# Patient Record
Sex: FEMALE | Race: WHITE | NOT HISPANIC OR LATINO | Employment: FULL TIME | URBAN - NONMETROPOLITAN AREA
[De-identification: names, ages, dates, MRNs, and addresses within clinical notes are randomized per-mention and may not be internally consistent; named-entity substitution may affect disease eponyms.]

---

## 2024-10-25 ENCOUNTER — OFFICE VISIT (OUTPATIENT)
Dept: URGENT CARE | Facility: URGENT CARE | Age: 24
End: 2024-10-25
Payer: COMMERCIAL

## 2024-10-25 ENCOUNTER — HOSPITAL ENCOUNTER (OUTPATIENT)
Dept: RADIOLOGY | Facility: CLINIC | Age: 24
Discharge: HOME | End: 2024-10-25
Payer: COMMERCIAL

## 2024-10-25 VITALS
HEART RATE: 90 BPM | BODY MASS INDEX: 20.2 KG/M2 | RESPIRATION RATE: 20 BRPM | OXYGEN SATURATION: 99 % | SYSTOLIC BLOOD PRESSURE: 104 MMHG | WEIGHT: 128.97 LBS | TEMPERATURE: 97.7 F | DIASTOLIC BLOOD PRESSURE: 81 MMHG

## 2024-10-25 DIAGNOSIS — J45.21 MILD INTERMITTENT ASTHMA WITH EXACERBATION (HHS-HCC): ICD-10-CM

## 2024-10-25 DIAGNOSIS — R09.89 ABNORMAL LUNG SOUNDS: Primary | ICD-10-CM

## 2024-10-25 DIAGNOSIS — J30.89 SEASONAL ALLERGIC RHINITIS DUE TO OTHER ALLERGIC TRIGGER: ICD-10-CM

## 2024-10-25 DIAGNOSIS — R09.89 ABNORMAL LUNG SOUNDS: ICD-10-CM

## 2024-10-25 DIAGNOSIS — J02.8 PHARYNGITIS DUE TO OTHER ORGANISM: ICD-10-CM

## 2024-10-25 PROBLEM — D64.9 ANEMIA: Status: ACTIVE | Noted: 2024-10-25

## 2024-10-25 PROBLEM — G23.8: Status: ACTIVE | Noted: 2022-08-30

## 2024-10-25 PROBLEM — R06.02 SOB (SHORTNESS OF BREATH): Status: ACTIVE | Noted: 2024-10-25

## 2024-10-25 LAB
POC RAPID STREP: NEGATIVE
PREGNANCY TEST URINE, POC: NEGATIVE

## 2024-10-25 PROCEDURE — 99214 OFFICE O/P EST MOD 30 MIN: CPT | Performed by: PHYSICIAN ASSISTANT

## 2024-10-25 PROCEDURE — 81025 URINE PREGNANCY TEST: CPT | Performed by: PHYSICIAN ASSISTANT

## 2024-10-25 PROCEDURE — 87880 STREP A ASSAY W/OPTIC: CPT | Performed by: PHYSICIAN ASSISTANT

## 2024-10-25 PROCEDURE — 87651 STREP A DNA AMP PROBE: CPT

## 2024-10-25 PROCEDURE — 71046 X-RAY EXAM CHEST 2 VIEWS: CPT

## 2024-10-25 RX ORDER — INHALER, ASSIST DEVICES
SPACER (EA) MISCELLANEOUS
Qty: 1 EACH | Refills: 0 | Status: SHIPPED | OUTPATIENT
Start: 2024-10-25 | End: 2025-10-25

## 2024-10-25 RX ORDER — ALBUTEROL SULFATE 90 UG/1
2 INHALANT RESPIRATORY (INHALATION) EVERY 6 HOURS PRN
Qty: 8.5 G | Refills: 0 | Status: SHIPPED | OUTPATIENT
Start: 2024-10-25 | End: 2025-10-25

## 2024-10-25 RX ORDER — CETIRIZINE HYDROCHLORIDE 10 MG/1
10 TABLET ORAL DAILY
Qty: 30 TABLET | Refills: 0 | Status: SHIPPED | OUTPATIENT
Start: 2024-10-25 | End: 2025-10-25

## 2024-10-25 RX ORDER — METHYLPREDNISOLONE 4 MG/1
TABLET ORAL
Qty: 21 TABLET | Refills: 0 | Status: SHIPPED | OUTPATIENT
Start: 2024-10-25 | End: 2024-11-01

## 2024-10-25 RX ORDER — FLUTICASONE PROPIONATE 50 MCG
1 SPRAY, SUSPENSION (ML) NASAL 2 TIMES DAILY
Qty: 16 G | Refills: 0 | Status: SHIPPED | OUTPATIENT
Start: 2024-10-25 | End: 2024-11-01

## 2024-10-25 RX ORDER — AMOXICILLIN AND CLAVULANATE POTASSIUM 875; 125 MG/1; MG/1
875 TABLET, FILM COATED ORAL 2 TIMES DAILY
Qty: 20 TABLET | Refills: 0 | Status: SHIPPED | OUTPATIENT
Start: 2024-10-25

## 2024-10-25 ASSESSMENT — PAIN SCALES - GENERAL: PAINLEVEL_OUTOF10: 4

## 2024-10-25 NOTE — PATIENT INSTRUCTIONS
Asthma exacerbation- Start Albuterol inhaler with spacer 2 puffs every 6 hr as needed cough or wheeze. Start Medrol dose serge as directed, take with food. Avoid NSAIDS while on oral steroid. Go to ER if worsening breathing or chest pain or dizziness. Smoking cessation to help with healing and recovery.    Allergic rhinitis with suspected sinusitis- start Augmentin twice a day x 10 days, take with food and probiotic. Start Flonase 1 spray each nostril daily and zyrtec 10mg daily x 1 week. Recommend OTC expectorant such as robitussin with plenty of fluids    Pharyngitis- Rapid strep negative. Strep PCR sent.   Strep Precautions:   No kissing or sharing utensils or cups for 24 hours.   Change ToothBrush and wash bedding after completing 24 hours of antibiotic.   Return to work after completing 24 hours of antibiotic and fever free for 24 hours

## 2024-10-25 NOTE — PROGRESS NOTES
Subjective   Patient ID: Kinsey Nicole is a 24 y.o. female with history of sport induced asthma present today with a chief complaint of Other (Pt. C/O sore throat, body aches, tired and cold. /Started 1 week ago. /Boy friend was here 2 days ago and was positive for strep. ).    History of Present Illness  HPI  Pt reports sore throat worse in the morning. Productive cough with wheezing and recently dyspnea with going up the stairs. She reports normal appetite, mostly soft foods due to sore throat. No vomiting or dysphagia. No fever. She reports her  was diagnosed with strep and requesting testing. She has tried Dayquil and ibuprofen with temporary relief. Pt is sexually active and not using contraception. LMP 10/1/24.     Past Medical History  Allergies as of 10/25/2024    (No Known Allergies)       (Not in a hospital admission)       Past Medical History:   Diagnosis Date    Acute sinusitis, unspecified 04/06/2016    Acute non-recurrent sinusitis, unspecified location    Other specified health status     Known health problems: none    Personal history of diseases of the skin and subcutaneous tissue 12/16/2015    History of contact dermatitis    Personal history of diseases of the skin and subcutaneous tissue 01/04/2016    History of impetigo    Personal history of other diseases of the female genital tract 10/24/2018    History of dysfunctional uterine bleeding    Personal history of other diseases of the respiratory system 04/06/2016    History of upper respiratory infection    Personal history of other diseases of the respiratory system 10/10/2014    History of acute sinusitis    Personal history of other diseases of the respiratory system 10/13/2014    History of acute bronchitis    Personal history of other diseases of the respiratory system 04/06/2016    History of sore throat    Personal history of other specified conditions 06/05/2015    History of dizziness       History reviewed. No pertinent surgical  history.     reports that she has been smoking cigarettes. She has never used smokeless tobacco. She reports current drug use. Drug: Marijuana.    Review of Systems  Review of Systems                               Objective    Vitals:    10/25/24 0851   BP: 104/81   BP Location: Right arm   Patient Position: Sitting   BP Cuff Size: Adult   Pulse: 90   Resp: 20   Temp: 36.5 °C (97.7 °F)   TempSrc: Oral   SpO2: 99%   Weight: 58.5 kg (128 lb 15.5 oz)     Patient's last menstrual period was 10/01/2024.    Physical Exam  Vitals and nursing note reviewed.   Constitutional:       Appearance: Normal appearance. She is not ill-appearing.      Comments: Pleasant white female   HENT:      Head: Normocephalic and atraumatic.      Right Ear: Ear canal and external ear normal.      Left Ear: Ear canal and external ear normal.      Ears:      Comments: Air fluid levels b/l     Nose: Congestion and rhinorrhea present.      Comments: Erythematous edematous turbinates     Mouth/Throat:      Mouth: Mucous membranes are moist.      Pharynx: Posterior oropharyngeal erythema present.      Comments: Mild tonsillar hypertrophy, moderate post nasal drip  Eyes:      Extraocular Movements: Extraocular movements intact.      Conjunctiva/sclera: Conjunctivae normal.      Pupils: Pupils are equal, round, and reactive to light.   Cardiovascular:      Rate and Rhythm: Normal rate and regular rhythm.      Heart sounds: No murmur heard.  Pulmonary:      Effort: Pulmonary effort is normal.      Breath sounds: Wheezing present.      Comments: ENA rhonchi  Musculoskeletal:         General: Normal range of motion.      Cervical back: Normal range of motion and neck supple.   Lymphadenopathy:      Cervical: Cervical adenopathy present.   Skin:     General: Skin is warm and dry.      Findings: No rash.   Neurological:      General: No focal deficit present.      Mental Status: She is alert and oriented to person, place, and time.   Psychiatric:          Mood and Affect: Mood normal.         Procedures    Point of Care Test & Imaging Results from this visit  Results for orders placed or performed in visit on 10/25/24   Group A Streptococcus, PCR    Specimen: Throat/Pharynx; Swab   Result Value Ref Range    Group A Strep PCR Not Detected Not Detected   POCT rapid strep A manually resulted   Result Value Ref Range    POC Rapid Strep Negative Negative   POCT pregnancy, urine manually resulted   Result Value Ref Range    Preg Test, Ur Negative Negative      No results found.  Narrative & Impression   STUDY:  Chest Radiographs;  10/25/2023, 09:29AM  INDICATION:  Rhonchi left upper lobe, mildly diminished breath sounds, wheezing.  COMPARISON:  None Available  ACCESSION NUMBER(S):  [id number]  ORDERING CLINICIAN:  MADDISON CRUZ  TECHNIQUE:  Frontal and lateral chest.   FINDINGS:  CARDIOMEDIASTINAL SILHOUETTE:  Cardiomediastinal silhouette is normal in size and configuration.     LUNGS:  Lungs are clear.     ABDOMEN:  No remarkable upper abdominal findings.     BONES:  No acute osseous changes.  IMPRESSION:  No radiographic evidence of acute cardiopulmonary disease.  Signed by Sven Majano MD        Diagnostic study results (if any) were reviewed by Maddison Cruz PA-C.    Assessment/Plan   Allergies, medications, history, and pertinent labs/EKGs/Imaging reviewed by Maddison Cruz PA-C.     Medical Decision Making  25 yo F presents with c/o sore throat worse in the morning, body aches, feeling tired and cold x 1 week, exposed to  who had strep throat 2 days ago. Productive cough with wheezing and dyspnea with going up the stairs for past few days.  Reviewed vitals stable. Exam remarkable for nasal congestion, rhinorrhea, pharyngeal erythema, cobbling from post nasal drip and mild tonsillar hypertrophy w/o exudate, tender anterior shotty cervical lymphadenopathy, mildly diminished breath sounds diffuse expiratory wheezes and rhonchi ENA. CXR  reviewed, negative. Discussed negative Rapid Strep swab, Strep PCR sent per pt request. Strep Precautions reviewed. Discussed treatment of Asthma Exacerbation with Albuterol inhaler 2 puffs every 6h as needed, Medrol dose serge. Advised to go to ER if difficulty breathing or chest pain or dizziness. Advised to start Flonase nasal spray and zyrtec daily x1 week for underlying allergies.    Orders and Diagnoses  Diagnoses and all orders for this visit:  Abnormal lung sounds  -     XR chest 2 views; Future  -     POCT pregnancy, urine manually resulted  -     amoxicillin-pot clavulanate (Augmentin) 875-125 mg tablet; Take 1 tablet (875 mg) by mouth 2 times a day.  Pharyngitis due to other organism  -     POCT rapid strep A manually resulted  -     Group A Streptococcus, PCR  Mild intermittent asthma with exacerbation ([facility])  -     albuterol (ProAir HFA) 90 mcg/actuation inhaler; Inhale 2 puffs every 6 hours if needed for wheezing or shortness of breath. Use with spacer  -     inhalational spacing device (BreatheRite MDI Spacer) inhaler; Use as instructed with albuterol inhaler  -     POCT pregnancy, urine manually resulted  -     methylPREDNISolone (Medrol Dospak) 4 mg tablets; Follow schedule on package instructions  Seasonal allergic rhinitis due to other allergic trigger  -     fluticasone (Flonase) 50 mcg/actuation nasal spray; Administer 1 spray into each nostril 2 times a day for 7 days. Shake gently. Before first use, prime pump. After use, clean tip and replace cap.  -     cetirizine (ZyrTEC) 10 mg tablet; Take 1 tablet (10 mg) by mouth once daily.      Medical Admin Record      Patient disposition: Home    Electronically signed by Hanna Cruz PA-C  8:26 AM

## 2024-10-25 NOTE — LETTER
October 25, 2024     Patient: Kinsey Nicole   YOB: 2000   Date of Visit: 10/25/2024       To Whom It May Concern:    Kinsey Nicole was seen in my clinic on 10/25/2024 at 8:30 am. Please excuse Kinsey for her absence from work on this day to make the appointment.    If you have any questions or concerns, please don't hesitate to call.         Sincerely,         Hanna Cruz PA-C        CC: No Recipients

## 2024-10-26 LAB — S PYO DNA THROAT QL NAA+PROBE: NOT DETECTED

## 2025-06-25 ENCOUNTER — OFFICE VISIT (OUTPATIENT)
Dept: URGENT CARE | Facility: URGENT CARE | Age: 25
End: 2025-06-25
Payer: COMMERCIAL

## 2025-06-25 VITALS
HEART RATE: 89 BPM | DIASTOLIC BLOOD PRESSURE: 99 MMHG | SYSTOLIC BLOOD PRESSURE: 136 MMHG | TEMPERATURE: 98.3 F | WEIGHT: 130 LBS | RESPIRATION RATE: 18 BRPM | OXYGEN SATURATION: 97 % | BODY MASS INDEX: 20.36 KG/M2

## 2025-06-25 DIAGNOSIS — J01.00 ACUTE NON-RECURRENT MAXILLARY SINUSITIS: Primary | ICD-10-CM

## 2025-06-25 DIAGNOSIS — J02.9 SORE THROAT: ICD-10-CM

## 2025-06-25 DIAGNOSIS — J98.8 WHEEZING-ASSOCIATED RESPIRATORY INFECTION: ICD-10-CM

## 2025-06-25 LAB
POC HUMAN RHINOVIRUS PCR: NEGATIVE
POC INFLUENZA A VIRUS PCR: NEGATIVE
POC INFLUENZA B VIRUS PCR: NEGATIVE
POC RESPIRATORY SYNCYTIAL VIRUS PCR: NEGATIVE
POC SARS-COV-2 AG BINAX: NORMAL
POC STREPTOCOCCUS PYOGENES (GROUP A STREP) PCR: NEGATIVE

## 2025-06-25 PROCEDURE — 4004F PT TOBACCO SCREEN RCVD TLK: CPT | Performed by: PHYSICIAN ASSISTANT

## 2025-06-25 PROCEDURE — 87811 SARS-COV-2 COVID19 W/OPTIC: CPT | Performed by: PHYSICIAN ASSISTANT

## 2025-06-25 PROCEDURE — 99214 OFFICE O/P EST MOD 30 MIN: CPT | Performed by: PHYSICIAN ASSISTANT

## 2025-06-25 PROCEDURE — 87631 RESP VIRUS 3-5 TARGETS: CPT | Performed by: PHYSICIAN ASSISTANT

## 2025-06-25 PROCEDURE — 87651 STREP A DNA AMP PROBE: CPT | Performed by: PHYSICIAN ASSISTANT

## 2025-06-25 RX ORDER — CHOLECALCIFEROL (VITAMIN D3) 25 MCG
2000 TABLET ORAL
COMMUNITY
Start: 2025-05-29

## 2025-06-25 RX ORDER — PREDNISONE 20 MG/1
40 TABLET ORAL DAILY
Qty: 10 TABLET | Refills: 0 | Status: SHIPPED | OUTPATIENT
Start: 2025-06-25 | End: 2025-06-30

## 2025-06-25 RX ORDER — CALCITRIOL 0.25 UG/1
0.25 CAPSULE ORAL
COMMUNITY
Start: 2025-05-29

## 2025-06-25 RX ORDER — AMOXICILLIN AND CLAVULANATE POTASSIUM 875; 125 MG/1; MG/1
875 TABLET, FILM COATED ORAL 2 TIMES DAILY
Qty: 20 TABLET | Refills: 0 | Status: SHIPPED | OUTPATIENT
Start: 2025-06-25

## 2025-06-25 RX ORDER — PALOPEGTERIPARATIDE 294 UG/.98ML
18 INJECTION, SOLUTION SUBCUTANEOUS
COMMUNITY
Start: 2025-06-19

## 2025-06-25 ASSESSMENT — ENCOUNTER SYMPTOMS
SORE THROAT: 1
DIZZINESS: 1
COUGH: 1

## 2025-06-25 NOTE — PATIENT INSTRUCTIONS
Acute sore throat- 5 Respiratory PCR panel negative for human rhinovirus, influenza A & B, RSV and Group A Strep. Recommend home Covid test today. Recommend warm salt water gargles 2-3 times a day, throat lozenges (cepacol), tylenol 500mg every 6hr for pain (max 4000mg in 24 hr).    Acute wheezing respiratory illness- suspect reactive airway disease vs mucus plugging. Recommend Albuterol inhaler with spacer 2 puffs every 6hrs as needed for cough or wheeze. Start Prednisone 40mg daily x 5 days; take with food and Pepcid 20mg twice a day until steroid completed to decrease GI upset. Recommend otc Robitussin with plenty of fluids (2L/day water) and clear pedialyte once a day for electrolytes.   Go to ER if chest pain or difficulty breathing or dizziness or fever 103 with headache or neck stiffness.    Bilateral eustachian tube dysfunction- trial of flonase 1 spray each nostril twice a day x 3 days and zyrtec 10mg daily x 1 week.

## 2025-06-25 NOTE — LETTER
June 25, 2025     Patient: Kinsey Nicole   YOB: 2000   Date of Visit: 6/25/2025       To Whom It May Concern:    Kinsey Nicole was seen in my clinic on 6/25/2025 at 1:05 pm. Please excuse the parent of Kinsey for her absence from work on this day to assist this patient to her appointment.    If you have any questions or concerns, please don't hesitate to call.         Sincerely,         Hanna Cruz PA-C        CC: No Recipients

## 2025-06-25 NOTE — PROGRESS NOTES
"Subjective   Patient ID: Kinsey Nicole is a 24 y.o. female with calcification of basal ganglia with chronic dizziness present with mom today with a chief complaint of Nasal Congestion (2 days ), Sore Throat (2 days ), Cough (Coughing up a lot of mucus pain when coughing 2 days ), Dizziness (2 days ), and Chills (2 days).    History of Present Illness    Sore Throat   Associated symptoms include coughing.   Cough  Associated symptoms include a sore throat.   Dizziness    Pt reports her mom drove her today. Pt reports symptoms started with mild cough and tired 3 days ago, got worse yesterday with dizziness. Pt reports chills but never checked her temps. Pt reports dizziness with nausea occurs with standing up or moving her head to fast, initially feels off balance equilibrium is off then feels like she can pass out. Hx of seizures in September 2024, with overnight hospitalization. No vomiting. Pt has dry heaving every morning. Pt reports productive cough with \"lungs hurt so bad.\" No hx of asthma but used rescue inhaler for covid infection. FH of sister with asthma.  Pt did not take any otc meds. She started yorvipath for her chronic condition 9 days ago. Pt reports quit smoking few days ago.  Past Medical History  Allergies as of 06/25/2025    (No Known Allergies)       Prescriptions Prior to Admission[1]     Medical History[2]    Surgical History[3]     reports that she has been smoking cigarettes. She has never used smokeless tobacco. She reports current drug use. Drug: Marijuana.    Review of Systems  Review of Systems   HENT:  Positive for sore throat.    Respiratory:  Positive for cough.    Neurological:  Positive for dizziness.                                  Objective    Vitals:    06/25/25 1319   BP: (!) 136/99   Pulse: 89   Resp: 18   Temp: 36.8 °C (98.3 °F)   TempSrc: Oral   SpO2: 97%   Weight: 59 kg (130 lb)     Patient's last menstrual period was 06/20/2025.    Physical Exam  Constitutional:       " Appearance: Normal appearance.   HENT:      Head: Normocephalic and atraumatic.      Right Ear: Tympanic membrane normal.      Left Ear: Tympanic membrane normal.      Ears:      Comments: Air fluid levels     Nose: Congestion and rhinorrhea present.      Mouth/Throat:      Mouth: Mucous membranes are moist.      Pharynx: Posterior oropharyngeal erythema present. No oropharyngeal exudate.   Eyes:      Extraocular Movements: Extraocular movements intact.      Conjunctiva/sclera: Conjunctivae normal.      Pupils: Pupils are equal, round, and reactive to light.   Cardiovascular:      Rate and Rhythm: Normal rate and regular rhythm.      Heart sounds: No murmur heard.  Pulmonary:      Effort: Pulmonary effort is normal. No respiratory distress.      Breath sounds: Wheezing (diffuse) present.   Musculoskeletal:         General: Normal range of motion.      Cervical back: Normal range of motion and neck supple.   Lymphadenopathy:      Cervical: No cervical adenopathy.   Skin:     General: Skin is warm.   Neurological:      General: No focal deficit present.      Mental Status: She is alert.         Procedures    Point of Care Test & Imaging Results from this visit  Results for orders placed or performed in visit on 06/25/25   POCT SPOTFIRE R/ST Panel Mini w/Strep A ([facility]) manually resulted   Result Value Ref Range    POC Group A Strep, PCR Negative Negative    POC Respiratory Syncytial Virus PCR Negative Negative    POC Influenza A Virus PCR Negative Negative    POC Influenza B Virus PCR Negative Negative    POC Human Rhinovirus PCR Negative Negative   POCT Covid-19 Rapid Antigen   Result Value Ref Range    POC REAL-COV-2 AG  Presumptive negative test for SARS-CoV-2 (no antigen detected)     Presumptive negative test for SARS-CoV-2 (no antigen detected)      Imaging  No results found.    Cardiology, Vascular, and Other Imaging  No other imaging results found for the past 2 days      Diagnostic study results (if any)  were reviewed by Hanna Cruz PA-C.    Assessment/Plan   Allergies, medications, history, and pertinent labs/EKGs/Imaging reviewed by Hanna Cruz PA-C.     Medical Decision Making  24 y.o. female with calcification of basal ganglia with chronic dizziness present with mom today with a chief complaint of Nasal Congestion (2 days ), Sore Throat (2 days ), Cough (Coughing up a lot of mucus pain when coughing 2 days ), Dizziness (2 days ), and Chills (2 days).  Reviewed vitals stable. Exam remarkable for B/L air fluid levels, nasal congestion, rhinorrhea, pharyngeal erythema without exudate, mildly diminished breath sounds with diffuse exp wheezes without respiratory distress     Discussed with patient treatment for Acute sore throat- 5 Respiratory PCR panel negative for human rhinovirus, influenza A & B, RSV and Group A Strep. Recommend home Covid test today. Recommend warm salt water gargles 2-3 times a day, throat lozenges (cepacol), tylenol 500mg every 6hr for pain (max 4000mg in 24 hr).    Acute wheezing respiratory illness- suspect reactive airway disease vs mucus plugging. Recommend Albuterol inhaler with spacer 2 puffs every 6hrs as needed for cough or wheeze. Start Prednisone 40mg daily x 5 days; take with food and Pepcid 20mg twice a day until steroid completed to decrease GI upset. Recommend otc Robitussin with plenty of fluids (2L/day water) and clear pedialyte once a day for electrolytes.   Go to ER if chest pain or difficulty breathing or dizziness or fever 103 with headache or neck stiffness.    Bilateral eustachian tube dysfunction causing vertigo- trial of flonase 1 spray each nostril twice a day x 3 days and zyrtec 10mg daily x 1 week.    Orders and Diagnoses  Diagnoses and all orders for this visit:  Acute non-recurrent maxillary sinusitis  -     amoxicillin-clavulanate (Augmentin) 875-125 mg tablet; Take 1 tablet (875 mg of amoxicillin) by mouth 2 times a day.  Sore throat  -      POCT SPOTFIRE R/ST Panel Mini w/Strep A ([facility]) manually resulted  -     POCT Covid-19 Rapid Antigen  Wheezing-associated respiratory infection  -     POCT SPOTFIRE R/ST Panel Mini w/Strep A ([facility]) manually resulted  -     POCT Covid-19 Rapid Antigen  -     predniSONE (Deltasone) 20 mg tablet; Take 2 tablets (40 mg) by mouth once daily for 5 days.      Medical Admin Record      Patient disposition: Home    Electronically signed by Hanna Cruz PA-C  2:26 PM           [1] (Not in a hospital admission)   [2]   Past Medical History:  Diagnosis Date    Acute sinusitis, unspecified 04/06/2016    Acute non-recurrent sinusitis, unspecified location    Other specified health status     Known health problems: none    Personal history of diseases of the skin and subcutaneous tissue 12/16/2015    History of contact dermatitis    Personal history of diseases of the skin and subcutaneous tissue 01/04/2016    History of impetigo    Personal history of other diseases of the female genital tract 10/24/2018    History of dysfunctional uterine bleeding    Personal history of other diseases of the respiratory system 04/06/2016    History of upper respiratory infection    Personal history of other diseases of the respiratory system 10/10/2014    History of acute sinusitis    Personal history of other diseases of the respiratory system 10/13/2014    History of acute bronchitis    Personal history of other diseases of the respiratory system 04/06/2016    History of sore throat    Personal history of other specified conditions 06/05/2015    History of dizziness   [3] History reviewed. No pertinent surgical history.

## 2025-06-26 ENCOUNTER — TELEPHONE (OUTPATIENT)
Dept: URGENT CARE | Facility: URGENT CARE | Age: 25
End: 2025-06-26

## 2025-08-07 ENCOUNTER — OFFICE VISIT (OUTPATIENT)
Dept: URGENT CARE | Facility: URGENT CARE | Age: 25
End: 2025-08-07
Payer: COMMERCIAL

## 2025-08-07 VITALS
WEIGHT: 130 LBS | HEART RATE: 63 BPM | OXYGEN SATURATION: 98 % | SYSTOLIC BLOOD PRESSURE: 125 MMHG | DIASTOLIC BLOOD PRESSURE: 81 MMHG | BODY MASS INDEX: 20.36 KG/M2 | RESPIRATION RATE: 15 BRPM | TEMPERATURE: 97.7 F

## 2025-08-07 DIAGNOSIS — R11.2 NAUSEA AND VOMITING, UNSPECIFIED VOMITING TYPE: Primary | ICD-10-CM

## 2025-08-07 RX ORDER — PROCHLORPERAZINE MALEATE 5 MG
5 TABLET ORAL EVERY 6 HOURS PRN
Qty: 30 TABLET | Refills: 0 | Status: SHIPPED | OUTPATIENT
Start: 2025-08-07 | End: 2025-08-14

## 2025-08-07 RX ORDER — ONDANSETRON HYDROCHLORIDE 4 MG/2ML
4 INJECTION, SOLUTION INTRAMUSCULAR; INTRAVENOUS ONCE
Status: COMPLETED | OUTPATIENT
Start: 2025-08-07 | End: 2025-08-07

## 2025-08-07 RX ORDER — ONDANSETRON 4 MG/1
4 TABLET, FILM COATED ORAL EVERY 8 HOURS PRN
Qty: 10 TABLET | Refills: 0 | Status: SHIPPED | OUTPATIENT
Start: 2025-08-07 | End: 2025-08-14

## 2025-08-07 RX ADMIN — ONDANSETRON HYDROCHLORIDE 4 MG: 4 INJECTION, SOLUTION INTRAMUSCULAR; INTRAVENOUS at 15:54

## 2025-08-07 ASSESSMENT — ENCOUNTER SYMPTOMS
PSYCHIATRIC NEGATIVE: 1
VOMITING: 1
NEUROLOGICAL NEGATIVE: 1
NAUSEA: 1
CONSTITUTIONAL NEGATIVE: 1
ENDOCRINE NEGATIVE: 1
EYES NEGATIVE: 1
HEMATOLOGIC/LYMPHATIC NEGATIVE: 1
RESPIRATORY NEGATIVE: 1
CARDIOVASCULAR NEGATIVE: 1
ALLERGIC/IMMUNOLOGIC NEGATIVE: 1
ABDOMINAL PAIN: 1
MUSCULOSKELETAL NEGATIVE: 1

## 2025-08-07 NOTE — LETTER
August 7, 2025     Patient: Kinsey Nicole   YOB: 2000   Date of Visit: 8/7/2025       To Whom It May Concern:    Kinsey Nicole was seen in my clinic on 8/7/2025 at 3:40 pm. Please excuse Kinsey for her absence from work on this day to make the appointment.    If you have any questions or concerns, please don't hesitate to call.         Sincerely,         Magda Tam, APRN-CNP        CC: No Recipients

## 2025-08-08 NOTE — PROGRESS NOTES
Subjective   Patient ID: Kinsey Nicole is a 25 y.o. female. They present today with a chief complaint of Vomiting (Since 7 am this morning ) and Chills (Started this morning ).    History of Present Illness    History provided by:  Police  Vomiting  Severity:  Severe  Duration:  1 day  Timing:  Constant  Number of daily episodes:  10  Quality:  Bilious material  Able to tolerate:  Liquids  How soon after eating does vomiting occur:  1 second  Progression:  Worsening  Chronicity:  Chronic  Recent urination:  Decreased  Relieved by:  Nothing  Worsened by:  Food smell  Ineffective treatments:  None tried  Associated symptoms: abdominal pain        Past Medical History  Allergies as of 08/07/2025    (No Known Allergies)       Prescriptions Prior to Admission[1]     Medical History[2]    Surgical History[3]     reports that she has been smoking cigarettes. She has never used smokeless tobacco. She reports current drug use. Drug: Marijuana.    Review of Systems  Review of Systems   Constitutional: Negative.    HENT: Negative.     Eyes: Negative.    Respiratory: Negative.     Cardiovascular: Negative.    Gastrointestinal:  Positive for abdominal pain, nausea and vomiting.   Endocrine: Negative.    Genitourinary: Negative.    Musculoskeletal: Negative.    Skin: Negative.    Allergic/Immunologic: Negative.    Neurological: Negative.    Hematological: Negative.    Psychiatric/Behavioral: Negative.     All other systems reviewed and are negative.                                 Objective    Vitals:    08/07/25 1539   BP: 125/81   Pulse: 63   Resp: 15   Temp: 36.5 °C (97.7 °F)   TempSrc: Oral   SpO2: 98%   Weight: 59 kg (130 lb)     Patient's last menstrual period was 07/20/2025.    Physical Exam  Vitals and nursing note reviewed.   Constitutional:       Appearance: Normal appearance. She is normal weight.     Cardiovascular:      Rate and Rhythm: Normal rate and regular rhythm.      Pulses: Normal pulses.      Heart sounds:  Normal heart sounds.   Pulmonary:      Effort: Pulmonary effort is normal.      Breath sounds: Normal breath sounds.   Abdominal:      Palpations: Abdomen is soft.      Tenderness: There is abdominal tenderness in the epigastric area. There is no guarding or rebound.     Skin:     General: Skin is warm and dry.     Neurological:      General: No focal deficit present.      Mental Status: She is alert and oriented to person, place, and time. Mental status is at baseline.     Psychiatric:         Mood and Affect: Mood normal.         Behavior: Behavior normal.         Thought Content: Thought content normal.         Judgment: Judgment normal.         Procedures    Point of Care Test & Imaging Results from this visit  No results found for this visit on 08/07/25.   Imaging  No results found.    Cardiology, Vascular, and Other Imaging  No other imaging results found for the past 2 days      Diagnostic study results (if any) were reviewed by AURELIA Stephen.    Assessment/Plan   Allergies, medications, history, and pertinent labs/EKGs/Imaging reviewed by AURELIA Stephen.     Medical Decision Making  Medical Decision Making  At time of discharge patient was clinically well-appearing and HDS for outpatient management. The patient and/or family was educated regarding diagnosis, supportive care, OTC and Rx medications. The patient and/or family was given the opportunity to ask questions prior to discharge.  They verbalized understanding of my discussion of the plans for treatment, expected course, indications to return to  or seek further evaluation in ED, and the need for timely follow up as directed.   They were provided with a work/school excuse if requested.        Orders and Diagnoses  Diagnoses and all orders for this visit:  Nausea and vomiting, unspecified vomiting type  -     ondansetron HCl (PF) (ZOFRAN) injection 4 mg  -     ondansetron (Zofran) 4 mg tablet; Take 1 tablet (4 mg) by mouth  every 8 hours if needed for nausea or vomiting for up to 7 days.  -     prochlorperazine (Compazine) 5 mg tablet; Take 1 tablet (5 mg) by mouth every 6 hours if needed for nausea or vomiting for up to 7 days.      Medical Admin Record  Administrations This Visit       ondansetron HCl (PF) (ZOFRAN) injection 4 mg       Admin Date  08/07/2025 Action  Given Dose  4 mg Route  intravenous Documented By  Elmira Dejesus MA                  Return to Urgent care if symptoms return or progress  Follow up with PCP in 1-2 weeks   Patient disposition: Home    Electronically signed by AURELIA Stephen  8:02 PM           [1] (Not in a hospital admission)  [2]   Past Medical History:  Diagnosis Date    Acute sinusitis, unspecified 04/06/2016    Acute non-recurrent sinusitis, unspecified location    Other specified health status     Known health problems: none    Personal history of diseases of the skin and subcutaneous tissue 12/16/2015    History of contact dermatitis    Personal history of diseases of the skin and subcutaneous tissue 01/04/2016    History of impetigo    Personal history of other diseases of the female genital tract 10/24/2018    History of dysfunctional uterine bleeding    Personal history of other diseases of the respiratory system 04/06/2016    History of upper respiratory infection    Personal history of other diseases of the respiratory system 10/10/2014    History of acute sinusitis    Personal history of other diseases of the respiratory system 10/13/2014    History of acute bronchitis    Personal history of other diseases of the respiratory system 04/06/2016    History of sore throat    Personal history of other specified conditions 06/05/2015    History of dizziness   [3] No past surgical history on file.